# Patient Record
Sex: MALE | Race: WHITE | URBAN - METROPOLITAN AREA
[De-identification: names, ages, dates, MRNs, and addresses within clinical notes are randomized per-mention and may not be internally consistent; named-entity substitution may affect disease eponyms.]

---

## 2020-07-28 ENCOUNTER — OFFICE VISIT (OUTPATIENT)
Dept: DERMATOLOGY | Age: 45
End: 2020-07-28
Payer: COMMERCIAL

## 2020-07-28 VITALS — TEMPERATURE: 98.8 F

## 2020-07-28 PROCEDURE — 99203 OFFICE O/P NEW LOW 30 MIN: CPT | Performed by: DERMATOLOGY

## 2020-07-28 NOTE — PATIENT INSTRUCTIONS
Dry Skin Care    Bath Temperature:  Use lukewarm water; hot water dries the skin, and although it may feel good for a few minutes, it will itch even more later on. Length of Bath:  A short shower is much better than a long one. Long showers or baths wash off the natural protective oils of the skin. A lukewarm tub-bath is OK, particularly if you like to use bath oil in the water. Always remember that a quick, cool shower using no soap is preferable to a long, hot soapy one. Cleansing:  Soaps and detergents remove the natural protective oils, leaving the skin dry and irritated. If cleansing is really necessary, soaps such as Basis, Cetaphil, Dove, or Purpose are acceptable. Body washes with petrolatum, or oils such as soybean oil, sunflower seed oil are also okay (Dove, Eucerin, etc. have these). However, clear water will remove dust and sweat and grime and soaps and washes are needed only in the odor bearing areas (underarms, groin, feet). It is important to rinse these areas very well to remove all traces of soap. Avoid excessive scrubbing of the outer arms, legs, and back, and use your hands to bathe--not a washcloth, loofah, or sponge. Moisturizing:  Bath or mineral oils can be added to the water or applied directly to the wet skin after the shower or tub bath. Avoid oils with strong perfumes that irritate your skin. Thicker creams like Vanicream, Cetaphil and Eucerin, or ointments like Aquaphor and white petroleum jelly (Vaseline) are better. They may be greasier, but are more effective. Put them on immediately after bathing and patting dry. Additional Suggestions and Summary:     Do not take bubble baths      Do not allow the blower from a furnace or car heater to blow on your skin     Avoid using alcohol, astringents, calamine lotion, hydrogen peroxide, powders or deodorant sprays as these will dry out your skin      Avoid hot tubs.  Hot chlorinated water will strip the natural oils from your skin     Use soap less often, and try a mild fragrance-free soap.  Use moisturizer during the day as often as possible.  Within 3 minutes after bathing, apply the moisturizer to your entire body to trap the moisture in your skin.  Avoid long, hot showers because hot water removes oils from your skin more quickly.  Ointments (clear, thick, greasy) and creams (white, thick, in a jar) work better as moisturizers than lotions (white, thin, easier to spread).  Ointment examples: Aquaphor ointment, Vaseline (petrolatum, petroleum jelly).  Cream examples (creams are in jars):  Vanicream, CeraVe, Cetaphil,  Eucerin, Mild soap examples: Dove, Oil of Olay, Cetaphil, Purpose, CeraVe      A-B-C-D-E guide for Melanoma     Characteristics of unusual moles that may indicate melanomas or other skin cancers follow the A-B-C-D-E guide developed by the [address] [place] [address] [employer]:  1. A is for asymmetrical shape. Look for moles with irregular shapes, such as two very different-looking halves. 2. B is for irregular border. Look for moles with irregular, notched or scalloped borders, these may be characteristics of melanomas. 3. C is for changes in color. Look for growths that have many colors or an uneven distribution of color. 4. D is for diameter. Look for new growth in a mole larger than about 1/4 inch (6 millimeters) or about the size of a pencil eraser. 5. E is for evolving. This is the most important one. Look for changes over time, such as a mole that grows in size or that changes color or shape. Shakeel Nap may also evolve to develop new signs and symptoms, such as new itchiness or bleeding. If any of your moles appear to be changing, see your doctor. Other suspicious changes in a mole may include: Scaly skin, itching, spreading of color from the mole into the surrounding skin, oozing or bleeding. Cancerous (malignant) moles vary greatly in appearance.  Some may show all of the changes listed above, while others may have only one or two unusual characteristics. Please call and schedule an appointment if you have any concerns or questions. Skin Cancer Prevention: After Your Visit    Skin cancer is the abnormal growth of cells in the skin. It usually appears as a growth that changes in color, shape, or size. This can be a sore that does not heal or a change in a wart or a mole. Skin cancer is almost always curable when found early and treated. So it is important to see your doctor if you have any of these changes in your skin. Skin cancer is the most common type of cancer. It often appears on areas of the body that have been exposed to the sun, such as the head, face, neck, back, chest, or shoulders. Follow-up care is a key part of your treatment and safety. Be sure to make and go to all appointments, and call your doctor if you are having problems. It's also a good idea to know your test results and keep a list of the medicines you take. How can you care for yourself at home?  - Wear a wide-brimmed hat and long sleeves and pants if you are going to be outdoors for a long time. - Avoid the sun between 10 a.m. and 4 p.m., which is the peak time for UV rays. - Wear sunscreen on exposed skin. Make sure the sunscreen blocks ultraviolet rays (both UVA and UVB) and has a sun protection factor (SPF) of at least 30. Use it every day, even when it is cloudy. Some doctors may recommend a higher SPF, such as 48.  - Do not use tanning booths or sunlamps. - Use lip balm or cream that has sun protection factor (SPF) to protect your lips from getting sunburned or getting cold sores. - Wear sunglasses that block UV rays. When should you call for help? Watch closely for changes in your health, and be sure to contact your doctor if:  - You are concerned about any problem areas on your skin. - You notice a change in a mole or skin growth. For example:  a. It gets bigger.   b. It develops uneven

## 2020-07-29 NOTE — PROGRESS NOTES
Patient's Name: Governor Child  MRN: <[id number]>  YOB: 1975  Date of Visit: 7/28/2020  Primary Care Provider: No primary care provider on file. Referring Provider: No ref. provider found    Subjective:     Chief Complaint   Patient presents with    New Patient     tbse; no hx of sc        History of Present Illness:  Governor Child is a 40 y.o. male who presents in clinic today as a new patient seen as self referral for a full body skin examination and evaluation of a lesion on leg . They have not undergone a full body skin examination in the past.     What is it: bump  Signs and symptoms: none  Location: left leg  Severity: Severity now is 0/10. Modifying factors: Things that make this condition worse are none. Things that make this condition better are none. Duration: This lesion has been present for several years. Current treatment: none  Previous treatment: none      Past Dermatologic History:  negative personal history of skin cancer  negative personal history of melanoma  negative personal history of abnormal/dysplastic moles  negative personal history of tanning bed use  negative blistering sunburns    He reports  usually practicing sun protective habits using sunscreen    Social History:   Occupation:    Marital status: single   Smoking Status: never smoker  Hobbies: walking    Past Medical History:  Acne with scarring    Past Surgical History:  History reviewed. No pertinent surgical history. Past Family History:  History reviewed. No pertinent family history. Patient denies  a family history of cutaneous malignancy  Patient denies  a family history of abnormal moles  Patient denies  a family history of melanoma. Allergies: Allergies   Allergen Reactions    Penicillins Rash       Current Medications:  No current outpatient medications on file. No current facility-administered medications for this visit.         Review of Systems:  Constitutional: No fevers, chills or recent illness. feels well   Skin: Skin:As per HPI AND otherwise no new, bleeding or symptomatic skin lesions      Objective:   Physical Examination:  General: alert, comfortable, no apparent distress, well-appearing  Psych: alert, oriented and pleasant  Neuro: oriented to person, place, and time  Skin: Areas examined: head including face, lips, conjunctiva and lids, neck, hair/scalp, chest, including breasts and axilla, abdomen, back, buttocks, right upper extremity, left upper extremity, right lower extremity, left lower extremity, left hand, right hand, digits and nails, groin, genitalia and oropharynx including mucosa      All areas examined were within normal limits except those listed below with the appropriate assessment and plan    Assessment and Plan (with relevant objective exam findings):     1. Dermatofibroma  Location: left lateral leg  Objective findings: 3 mm reddish brown  firm {papulonodule(s) that dimple with lateral pressure. Dermatofibromas are commonly seen on the legs, but may occur elsewhere and may be caused by preceding trauma or bug bites. They can be removed surgically when irritated, growing or causing pain, but usually need no treatment and removal of lesions will produce a scar that may be more noticeable than the original spot. The patient was  reassured that the lesion was likely benign and that no further treatment is necessary at this time. However, it was reiterated that a definitive diagnosis is not possible without a biopsy and that if the lesion should ever begin to grow, change, or become symptomatic that they should consider biopsy and/or surgical removal. F/U for this lesion is PRN. Patient opted for no treatment at this time      2. Seborrheic Keratosis  Location: back, chest  Objective: Waxy, stuck on keratotic brown and tan  papules.      - Discussed the benign nature of these lesions and that there is no malignant potential.  Treatment is destructive or removal which would be considered cosmetic and not covered by insurance. Treatment today: reassurance. 3. Multiple melanocytic nevi  Location: back  Objective findings: multiple brown/pink macules and papules without abnormal findings or concerning findings on exam and dermatoscopy    -Counseled the patient that these are benign and need no therapy. Observation for any changes recommended       4. Scar conditions and fibrosis of skin  Location: central chest  Objective findings: atrophic ivory plaques    Counseled the patient that this is benign and needs no therapy. Observation for any changes recommended     Discussed that scars can become less noticeable over time, and occur due to trauma or surgical procedures. Sun protection and silicone gels or dressings can benefit scar appearance. If they are unsightly, dermabrasion, electroabrasion, fractionated electroablation, and laser therapy may help. Decision made to do the following: observe      5. Cherry Hemangiomas  Location:chest  Objective findings: Multiple bright red, dome-shaped papules     Discussed that these are benign lesions and require no treatment. Removal is usually not done unless they bleed or are irritated, in which case it may be medically necessary and can be done with electrodesiccation, shave removal, or laser therapy, but this may leave a scar that is not preferable to the lesion itself. Patient elected no treatment      - The importance of continued surveillance was discussed. Monthly self examinations were encouraged. - Signs of cutaneous malignancy were discussed and they were encouraged to contact me if they note any evolving, bleeding, painful or non-healing lesions. ABCDEs of melanoma also reviewed. - Photoprotection was encouraged and written material on sunscreen provided. Follow up:  Return visit in 1-2 years or as needed for change in condition. All questions addressed. Procedure:   No procedure performed        Payal Sánchez MD. MS

## 2020-08-01 PROBLEM — L70.9 ACNE: Status: ACTIVE | Noted: 2020-08-01

## 2020-08-01 PROBLEM — L73.0 POST-ACNE SCARRING: Status: ACTIVE | Noted: 2020-08-01

## 2020-08-01 PROBLEM — L70.9 ACNE: Status: RESOLVED | Noted: 2020-08-01 | Resolved: 2020-08-01
